# Patient Record
Sex: FEMALE | URBAN - METROPOLITAN AREA
[De-identification: names, ages, dates, MRNs, and addresses within clinical notes are randomized per-mention and may not be internally consistent; named-entity substitution may affect disease eponyms.]

---

## 2017-04-12 ENCOUNTER — IMPORTED ENCOUNTER (OUTPATIENT)
Dept: URBAN - METROPOLITAN AREA CLINIC 9 | Facility: CLINIC | Age: 67
End: 2017-04-12

## 2018-04-19 ENCOUNTER — IMPORTED ENCOUNTER (OUTPATIENT)
Dept: URBAN - METROPOLITAN AREA CLINIC 9 | Facility: CLINIC | Age: 68
End: 2018-04-19

## 2018-05-22 ENCOUNTER — IMPORTED ENCOUNTER (OUTPATIENT)
Dept: URBAN - METROPOLITAN AREA CLINIC 9 | Facility: CLINIC | Age: 68
End: 2018-05-22

## 2019-04-11 ENCOUNTER — IMPORTED ENCOUNTER (OUTPATIENT)
Dept: URBAN - METROPOLITAN AREA CLINIC 9 | Facility: CLINIC | Age: 69
End: 2019-04-11

## 2020-03-31 ENCOUNTER — IMPORTED ENCOUNTER (OUTPATIENT)
Dept: URBAN - METROPOLITAN AREA CLINIC 9 | Facility: CLINIC | Age: 70
End: 2020-03-31

## 2020-06-22 ENCOUNTER — IMPORTED ENCOUNTER (OUTPATIENT)
Dept: URBAN - METROPOLITAN AREA CLINIC 9 | Facility: CLINIC | Age: 70
End: 2020-06-22

## 2021-10-18 ASSESSMENT — VISUAL ACUITY
OS_SC: 20/20 - SN
OS_SC: 20/20 -2 SN
OD_CC: 20/25 - SN
OS_SC: 20/25 SN
OS_CC: 20/20 - SN
OS_CC: 20/20 SN
OS_CC: 20/20 SN
OS_SC: 20/20 -2 SN
OS_CC: 20/20 SN
OD_PH: 20/30 -2 SN
OD_SC: 20/400 SN
OD_SC: 20/70 SN
OS_CC: 20/20 -2 SN
OS_CC: 20/20 SN
OS_SC: 20/25 - SN
OD_CC: 20/20 SN
OD_CC: 20/20 SN
OD_SC: 20/60 SN
OD_CC: 20/20 - SN
OD_SC: 20/100 SN
OD_SC: 20/200 SN
OD_CC: 20/20 - SN
OS_SC: 20/20 -2 SN
OD_CC: 20/20 -2 SN

## 2021-10-18 ASSESSMENT — KERATOMETRY
OD_K1POWER_DIOPTERS: 44.5
OD_K1POWER_DIOPTERS: 44.25
OS_K2POWER_DIOPTERS: 46
OD_AXISANGLE2_DEGREES: 111
OD_AXISANGLE_DEGREES: 26
OS_AXISANGLE2_DEGREES: 87
OS_K1POWER_DIOPTERS: 43.75
OD_K2POWER_DIOPTERS: 45.25
OS_AXISANGLE_DEGREES: 176
OD_AXISANGLE_DEGREES: 21
OS_K2POWER_DIOPTERS: 46
OS_K1POWER_DIOPTERS: 44
OS_AXISANGLE_DEGREES: 177
OS_K1POWER_DIOPTERS: 44
OS_K2POWER_DIOPTERS: 46
OS_AXISANGLE2_DEGREES: 86
OD_AXISANGLE_DEGREES: 30
OD_AXISANGLE2_DEGREES: 120
OS_AXISANGLE_DEGREES: 176
OD_K1POWER_DIOPTERS: 44.25
OD_K2POWER_DIOPTERS: 46
OD_AXISANGLE2_DEGREES: 116
OD_K2POWER_DIOPTERS: 45.25
OS_AXISANGLE2_DEGREES: 86

## 2021-10-18 ASSESSMENT — TONOMETRY
OS_IOP_MMHG: 16
OD_IOP_MMHG: 18
OD_IOP_MMHG: 17
OD_IOP_MMHG: 15
OS_IOP_MMHG: 18
OS_IOP_MMHG: 15
OS_IOP_MMHG: 14
OS_IOP_MMHG: 18
OS_IOP_MMHG: 18
OD_IOP_MMHG: 18
OD_IOP_MMHG: 19